# Patient Record
Sex: FEMALE | Race: WHITE | Employment: PART TIME | URBAN - METROPOLITAN AREA
[De-identification: names, ages, dates, MRNs, and addresses within clinical notes are randomized per-mention and may not be internally consistent; named-entity substitution may affect disease eponyms.]

---

## 2017-02-13 ENCOUNTER — HOSPITAL ENCOUNTER (OUTPATIENT)
Dept: MRI IMAGING | Age: 52
Discharge: HOME | End: 2017-02-13
Attending: ORTHOPAEDIC SURGERY
Payer: COMMERCIAL

## 2017-02-13 DIAGNOSIS — M84.374A STRESS FRACTURE OF RIGHT FOOT: ICD-10-CM

## 2017-02-13 PROCEDURE — 73721 MRI JNT OF LWR EXTRE W/O DYE: CPT

## 2017-08-21 ENCOUNTER — HOSPITAL ENCOUNTER (OUTPATIENT)
Dept: MAMMOGRAPHY | Age: 52
Discharge: HOME | End: 2017-08-21
Attending: FAMILY MEDICINE
Payer: COMMERCIAL

## 2017-08-21 DIAGNOSIS — Z12.31 SCREENING MAMMOGRAM, ENCOUNTER FOR: ICD-10-CM

## 2017-08-21 PROCEDURE — 77067 SCR MAMMO BI INCL CAD: CPT | Performed by: FAMILY MEDICINE

## 2017-08-25 ENCOUNTER — HOSPITAL ENCOUNTER (OUTPATIENT)
Dept: MAMMOGRAPHY | Age: 52
Discharge: HOME | End: 2017-08-25
Attending: FAMILY MEDICINE
Payer: COMMERCIAL

## 2017-08-25 ENCOUNTER — HOSPITAL ENCOUNTER (OUTPATIENT)
Dept: ULTRASOUND IMAGING | Age: 52
Discharge: HOME | End: 2017-08-25
Attending: FAMILY MEDICINE
Payer: COMMERCIAL

## 2017-08-25 DIAGNOSIS — R92.8 ABNORMAL MAMMOGRAM OF BOTH BREASTS: ICD-10-CM

## 2017-08-25 PROCEDURE — 77062 BREAST TOMOSYNTHESIS BI: CPT | Performed by: FAMILY MEDICINE

## 2017-08-25 PROCEDURE — 77066 DX MAMMO INCL CAD BI: CPT | Performed by: FAMILY MEDICINE

## 2017-08-25 PROCEDURE — 76642 ULTRASOUND BREAST LIMITED: CPT | Performed by: FAMILY MEDICINE

## 2018-11-16 ENCOUNTER — CHARTING TRANS (OUTPATIENT)
Dept: OTHER | Age: 53
End: 2018-11-16

## 2019-02-06 ENCOUNTER — LAB ENCOUNTER (OUTPATIENT)
Dept: LAB | Facility: HOSPITAL | Age: 54
End: 2019-02-06
Attending: FAMILY MEDICINE
Payer: COMMERCIAL

## 2019-02-06 DIAGNOSIS — R20.0 TACTILE ANESTHESIA: ICD-10-CM

## 2019-02-06 DIAGNOSIS — R20.0 NUMBNESS: ICD-10-CM

## 2019-02-06 DIAGNOSIS — Z00.00 ROUTINE GENERAL MEDICAL EXAMINATION AT A HEALTH CARE FACILITY: Primary | ICD-10-CM

## 2019-02-06 LAB
ALBUMIN SERPL-MCNC: 3.5 G/DL (ref 3.1–4.5)
ALBUMIN/GLOB SERPL: 1 {RATIO} (ref 1–2)
ALP LIVER SERPL-CCNC: 87 U/L (ref 41–108)
ALT SERPL-CCNC: 31 U/L (ref 14–54)
ANION GAP SERPL CALC-SCNC: 4 MMOL/L (ref 0–18)
AST SERPL-CCNC: 35 U/L (ref 15–41)
BASOPHILS # BLD AUTO: 0.01 X10(3) UL (ref 0–0.2)
BASOPHILS NFR BLD AUTO: 0.2 %
BILIRUB SERPL-MCNC: 0.5 MG/DL (ref 0.1–2)
BUN BLD-MCNC: 19 MG/DL (ref 8–20)
BUN/CREAT SERPL: 21.6 (ref 10–20)
CALCIUM BLD-MCNC: 8.1 MG/DL (ref 8.3–10.3)
CHLORIDE SERPL-SCNC: 111 MMOL/L (ref 101–111)
CHOLEST SMN-MCNC: 175 MG/DL (ref ?–200)
CO2 SERPL-SCNC: 27 MMOL/L (ref 22–32)
CREAT BLD-MCNC: 0.88 MG/DL (ref 0.55–1.02)
DEPRECATED RDW RBC AUTO: 47.1 FL (ref 35.1–46.3)
EOSINOPHIL # BLD AUTO: 0.09 X10(3) UL (ref 0–0.7)
EOSINOPHIL NFR BLD AUTO: 1.5 %
ERYTHROCYTE [DISTWIDTH] IN BLOOD BY AUTOMATED COUNT: 13.2 % (ref 11–15)
GLOBULIN PLAS-MCNC: 3.4 G/DL (ref 2.8–4.4)
GLUCOSE BLD-MCNC: 85 MG/DL (ref 70–99)
HAV AB SERPL IA-ACNC: 588 PG/ML (ref 193–986)
HCT VFR BLD AUTO: 35.5 % (ref 35–48)
HDLC SERPL-MCNC: 81 MG/DL (ref 40–59)
HGB BLD-MCNC: 12 G/DL (ref 12–16)
IMM GRANULOCYTES # BLD AUTO: 0.01 X10(3) UL (ref 0–1)
IMM GRANULOCYTES NFR BLD: 0.2 %
LDLC SERPL CALC-MCNC: 85 MG/DL (ref ?–100)
LYMPHOCYTES # BLD AUTO: 1.2 X10(3) UL (ref 1–4)
LYMPHOCYTES NFR BLD AUTO: 20.6 %
M PROTEIN MFR SERPL ELPH: 6.9 G/DL (ref 6.4–8.2)
MCH RBC QN AUTO: 33.1 PG (ref 26–34)
MCHC RBC AUTO-ENTMCNC: 33.8 G/DL (ref 31–37)
MCV RBC AUTO: 98.1 FL (ref 80–100)
MONOCYTES # BLD AUTO: 0.31 X10(3) UL (ref 0.1–1)
MONOCYTES NFR BLD AUTO: 5.3 %
NEUTROPHILS # BLD AUTO: 4.21 X10 (3) UL (ref 1.5–7.7)
NEUTROPHILS # BLD AUTO: 4.21 X10(3) UL (ref 1.5–7.7)
NEUTROPHILS NFR BLD AUTO: 72.2 %
NONHDLC SERPL-MCNC: 94 MG/DL (ref ?–130)
OSMOLALITY SERPL CALC.SUM OF ELEC: 296 MOSM/KG (ref 275–295)
PLATELET # BLD AUTO: 198 10(3)UL (ref 150–450)
POTASSIUM SERPL-SCNC: 4.3 MMOL/L (ref 3.6–5.1)
RBC # BLD AUTO: 3.62 X10(6)UL (ref 3.8–5.3)
SODIUM SERPL-SCNC: 142 MMOL/L (ref 136–144)
TRIGL SERPL-MCNC: 46 MG/DL (ref 30–149)
TSI SER-ACNC: 4.1 MIU/ML (ref 0.35–5.5)
VIT D+METAB SERPL-MCNC: 50.5 NG/ML (ref 30–100)
VLDLC SERPL CALC-MCNC: 9 MG/DL (ref 0–30)
WBC # BLD AUTO: 5.8 X10(3) UL (ref 4–11)

## 2019-02-06 PROCEDURE — 82607 VITAMIN B-12: CPT

## 2019-02-06 PROCEDURE — 80053 COMPREHEN METABOLIC PANEL: CPT

## 2019-02-06 PROCEDURE — 85025 COMPLETE CBC W/AUTO DIFF WBC: CPT

## 2019-02-06 PROCEDURE — 84443 ASSAY THYROID STIM HORMONE: CPT

## 2019-02-06 PROCEDURE — 82306 VITAMIN D 25 HYDROXY: CPT

## 2019-02-06 PROCEDURE — 80061 LIPID PANEL: CPT

## 2019-02-06 PROCEDURE — 36415 COLL VENOUS BLD VENIPUNCTURE: CPT

## 2019-02-13 ENCOUNTER — LAB ENCOUNTER (OUTPATIENT)
Dept: LAB | Facility: HOSPITAL | Age: 54
End: 2019-02-13
Attending: FAMILY MEDICINE
Payer: COMMERCIAL

## 2019-02-13 DIAGNOSIS — R35.0 URINARY FREQUENCY: Primary | ICD-10-CM

## 2019-02-13 LAB
T3FREE SERPL-MCNC: 2.39 PG/ML (ref 2.4–4.2)
T4 FREE SERPL-MCNC: 0.8 NG/DL (ref 0.8–1.7)
THYROGLOB SERPL-MCNC: 305 U/ML (ref ?–60)
THYROPEROXIDASE AB SERPL-ACNC: 863 U/ML (ref ?–60)

## 2019-02-13 PROCEDURE — 84439 ASSAY OF FREE THYROXINE: CPT

## 2019-02-13 PROCEDURE — 86376 MICROSOMAL ANTIBODY EACH: CPT

## 2019-02-13 PROCEDURE — 36415 COLL VENOUS BLD VENIPUNCTURE: CPT

## 2019-02-13 PROCEDURE — 86800 THYROGLOBULIN ANTIBODY: CPT

## 2019-02-13 PROCEDURE — 84481 FREE ASSAY (FT-3): CPT

## 2019-04-02 ENCOUNTER — LAB ENCOUNTER (OUTPATIENT)
Dept: LAB | Facility: HOSPITAL | Age: 54
End: 2019-04-02
Attending: FAMILY MEDICINE
Payer: COMMERCIAL

## 2019-04-02 DIAGNOSIS — E06.3 HASHIMOTO'S DISEASE: Primary | ICD-10-CM

## 2019-04-02 PROCEDURE — 84443 ASSAY THYROID STIM HORMONE: CPT

## 2019-04-02 PROCEDURE — 36415 COLL VENOUS BLD VENIPUNCTURE: CPT

## 2019-04-02 PROCEDURE — 84439 ASSAY OF FREE THYROXINE: CPT

## 2019-04-02 PROCEDURE — 83018 HEAVY METAL QUAN EACH NES: CPT

## 2019-04-02 PROCEDURE — 84630 ASSAY OF ZINC: CPT

## 2019-04-02 PROCEDURE — 84481 FREE ASSAY (FT-3): CPT

## 2019-05-15 ENCOUNTER — LAB ENCOUNTER (OUTPATIENT)
Dept: LAB | Facility: HOSPITAL | Age: 54
End: 2019-05-15
Attending: FAMILY MEDICINE
Payer: COMMERCIAL

## 2019-05-15 DIAGNOSIS — E06.3 HASHIMOTO'S DISEASE: Primary | ICD-10-CM

## 2019-05-15 PROCEDURE — 84443 ASSAY THYROID STIM HORMONE: CPT

## 2019-05-15 PROCEDURE — 86431 RHEUMATOID FACTOR QUANT: CPT

## 2019-05-15 PROCEDURE — 84481 FREE ASSAY (FT-3): CPT

## 2019-05-15 PROCEDURE — 86038 ANTINUCLEAR ANTIBODIES: CPT

## 2019-05-15 PROCEDURE — 36415 COLL VENOUS BLD VENIPUNCTURE: CPT

## 2019-05-15 PROCEDURE — 86200 CCP ANTIBODY: CPT

## 2019-05-15 PROCEDURE — 86376 MICROSOMAL ANTIBODY EACH: CPT

## 2019-05-15 PROCEDURE — 84439 ASSAY OF FREE THYROXINE: CPT

## 2019-05-15 PROCEDURE — 86800 THYROGLOBULIN ANTIBODY: CPT

## 2019-05-15 PROCEDURE — 85652 RBC SED RATE AUTOMATED: CPT

## 2019-08-26 ENCOUNTER — OFFICE VISIT (OUTPATIENT)
Dept: UROLOGY | Facility: HOSPITAL | Age: 54
End: 2019-08-26
Attending: OBSTETRICS & GYNECOLOGY
Payer: COMMERCIAL

## 2019-08-26 VITALS
HEIGHT: 66 IN | SYSTOLIC BLOOD PRESSURE: 90 MMHG | WEIGHT: 115 LBS | DIASTOLIC BLOOD PRESSURE: 56 MMHG | BODY MASS INDEX: 18.48 KG/M2

## 2019-08-26 DIAGNOSIS — R10.2 PELVIC PAIN: ICD-10-CM

## 2019-08-26 DIAGNOSIS — N95.2 POSTMENOPAUSAL ATROPHIC VAGINITIS: Primary | ICD-10-CM

## 2019-08-26 PROCEDURE — 99201 HC OUTPT EVAL AND MGNT NEW PT LEVEL 1: CPT

## 2019-08-26 RX ORDER — ESTRADIOL 0.1 MG/G
CREAM VAGINAL
Qty: 1 TUBE | Refills: 3 | Status: SHIPPED | OUTPATIENT
Start: 2019-08-26 | End: 2020-12-17

## 2019-08-26 RX ORDER — LEVOTHYROXINE SODIUM 0.05 MG/1
50 TABLET ORAL
COMMUNITY

## 2019-08-26 NOTE — PROGRESS NOTES
Patient to start estrace cream.  Call Rn with output numbers for a week.   Will return if numbers are high for post void residual.  Otherwise return in 3 months for UGA follow up and pelvic pain

## 2019-09-09 ENCOUNTER — LAB ENCOUNTER (OUTPATIENT)
Dept: LAB | Facility: HOSPITAL | Age: 54
End: 2019-09-09
Attending: FAMILY MEDICINE
Payer: COMMERCIAL

## 2019-09-09 DIAGNOSIS — E06.3 CHRONIC LYMPHOCYTIC THYROIDITIS: Primary | ICD-10-CM

## 2019-09-09 DIAGNOSIS — Z00.00 ROUTINE GENERAL MEDICAL EXAMINATION AT A HEALTH CARE FACILITY: ICD-10-CM

## 2019-09-09 LAB
ALBUMIN SERPL-MCNC: 3.8 G/DL (ref 3.4–5)
ALBUMIN/GLOB SERPL: 1.2 {RATIO} (ref 1–2)
ALP LIVER SERPL-CCNC: 83 U/L (ref 46–118)
ALT SERPL-CCNC: 32 U/L (ref 13–56)
ANION GAP SERPL CALC-SCNC: 3 MMOL/L (ref 0–18)
AST SERPL-CCNC: 26 U/L (ref 15–37)
BASOPHILS # BLD AUTO: 0.02 X10(3) UL (ref 0–0.2)
BASOPHILS NFR BLD AUTO: 0.7 %
BILIRUB SERPL-MCNC: 0.6 MG/DL (ref 0.1–2)
BUN BLD-MCNC: 18 MG/DL (ref 7–18)
BUN/CREAT SERPL: 19.4 (ref 10–20)
CALCIUM BLD-MCNC: 9 MG/DL (ref 8.5–10.1)
CHLORIDE SERPL-SCNC: 109 MMOL/L (ref 98–112)
CHOLEST SMN-MCNC: 207 MG/DL (ref ?–200)
CO2 SERPL-SCNC: 28 MMOL/L (ref 21–32)
CREAT BLD-MCNC: 0.93 MG/DL (ref 0.55–1.02)
DEPRECATED RDW RBC AUTO: 46 FL (ref 35.1–46.3)
EOSINOPHIL # BLD AUTO: 0.04 X10(3) UL (ref 0–0.7)
EOSINOPHIL NFR BLD AUTO: 1.4 %
ERYTHROCYTE [DISTWIDTH] IN BLOOD BY AUTOMATED COUNT: 12.8 % (ref 11–15)
GLOBULIN PLAS-MCNC: 3.2 G/DL (ref 2.8–4.4)
GLUCOSE BLD-MCNC: 92 MG/DL (ref 70–99)
HCT VFR BLD AUTO: 37.9 % (ref 35–48)
HDLC SERPL-MCNC: 92 MG/DL (ref 40–59)
HGB BLD-MCNC: 12.5 G/DL (ref 12–16)
IMM GRANULOCYTES # BLD AUTO: 0.01 X10(3) UL (ref 0–1)
IMM GRANULOCYTES NFR BLD: 0.4 %
LDLC SERPL CALC-MCNC: 103 MG/DL (ref ?–100)
LYMPHOCYTES # BLD AUTO: 1.03 X10(3) UL (ref 1–4)
LYMPHOCYTES NFR BLD AUTO: 36.9 %
M PROTEIN MFR SERPL ELPH: 7 G/DL (ref 6.4–8.2)
MCH RBC QN AUTO: 32.6 PG (ref 26–34)
MCHC RBC AUTO-ENTMCNC: 33 G/DL (ref 31–37)
MCV RBC AUTO: 99 FL (ref 80–100)
MONOCYTES # BLD AUTO: 0.27 X10(3) UL (ref 0.1–1)
MONOCYTES NFR BLD AUTO: 9.7 %
NEUTROPHILS # BLD AUTO: 1.42 X10 (3) UL (ref 1.5–7.7)
NEUTROPHILS # BLD AUTO: 1.42 X10(3) UL (ref 1.5–7.7)
NEUTROPHILS NFR BLD AUTO: 50.9 %
NONHDLC SERPL-MCNC: 115 MG/DL (ref ?–130)
OSMOLALITY SERPL CALC.SUM OF ELEC: 292 MOSM/KG (ref 275–295)
PLATELET # BLD AUTO: 197 10(3)UL (ref 150–450)
POTASSIUM SERPL-SCNC: 4.3 MMOL/L (ref 3.5–5.1)
RBC # BLD AUTO: 3.83 X10(6)UL (ref 3.8–5.3)
SODIUM SERPL-SCNC: 140 MMOL/L (ref 136–145)
T3FREE SERPL-MCNC: 2.35 PG/ML (ref 2.4–4.2)
T4 FREE SERPL-MCNC: 1 NG/DL (ref 0.8–1.7)
THYROGLOB SERPL-MCNC: 284 U/ML (ref ?–60)
THYROPEROXIDASE AB SERPL-ACNC: 1278 U/ML (ref ?–60)
TRIGL SERPL-MCNC: 59 MG/DL (ref 30–149)
TSI SER-ACNC: 0.95 MIU/ML (ref 0.36–3.74)
VLDLC SERPL CALC-MCNC: 12 MG/DL (ref 0–30)
WBC # BLD AUTO: 2.8 X10(3) UL (ref 4–11)

## 2019-09-09 PROCEDURE — 36415 COLL VENOUS BLD VENIPUNCTURE: CPT

## 2019-09-09 PROCEDURE — 84443 ASSAY THYROID STIM HORMONE: CPT

## 2019-09-09 PROCEDURE — 84481 FREE ASSAY (FT-3): CPT

## 2019-09-09 PROCEDURE — 85025 COMPLETE CBC W/AUTO DIFF WBC: CPT

## 2019-09-09 PROCEDURE — 80053 COMPREHEN METABOLIC PANEL: CPT

## 2019-09-09 PROCEDURE — 86800 THYROGLOBULIN ANTIBODY: CPT

## 2019-09-09 PROCEDURE — 84439 ASSAY OF FREE THYROXINE: CPT

## 2019-09-09 PROCEDURE — 86376 MICROSOMAL ANTIBODY EACH: CPT

## 2019-09-09 PROCEDURE — 80061 LIPID PANEL: CPT

## 2020-07-06 ENCOUNTER — LAB ENCOUNTER (OUTPATIENT)
Dept: LAB | Facility: HOSPITAL | Age: 55
End: 2020-07-06
Attending: FAMILY MEDICINE
Payer: COMMERCIAL

## 2020-07-06 DIAGNOSIS — E06.3 CHRONIC LYMPHOCYTIC THYROIDITIS: Primary | ICD-10-CM

## 2020-07-06 LAB
T3FREE SERPL-MCNC: 2.29 PG/ML (ref 2.4–4.2)
T4 FREE SERPL-MCNC: 0.9 NG/DL (ref 0.8–1.7)
TSI SER-ACNC: 1.98 MIU/ML (ref 0.36–3.74)

## 2020-07-06 PROCEDURE — 36415 COLL VENOUS BLD VENIPUNCTURE: CPT

## 2020-07-06 PROCEDURE — 84481 FREE ASSAY (FT-3): CPT

## 2020-07-06 PROCEDURE — 84439 ASSAY OF FREE THYROXINE: CPT

## 2020-07-06 PROCEDURE — 84443 ASSAY THYROID STIM HORMONE: CPT

## 2020-08-06 ENCOUNTER — LAB ENCOUNTER (OUTPATIENT)
Dept: LAB | Age: 55
End: 2020-08-06
Attending: ORTHOPAEDIC SURGERY
Payer: COMMERCIAL

## 2020-08-06 ENCOUNTER — HOSPITAL ENCOUNTER (OUTPATIENT)
Dept: BONE DENSITY | Age: 55
Discharge: HOME | End: 2020-08-06
Attending: ORTHOPAEDIC SURGERY
Payer: COMMERCIAL

## 2020-08-06 DIAGNOSIS — M81.0 AGE-RELATED OSTEOPOROSIS WITHOUT CURRENT PATHOLOGICAL FRACTURE: ICD-10-CM

## 2020-08-06 DIAGNOSIS — M72.2 PLANTAR FASCIAL FIBROMATOSIS: Primary | ICD-10-CM

## 2020-08-06 LAB
BASOPHILS # BLD AUTO: 0.02 X10(3) UL (ref 0–0.2)
BASOPHILS NFR BLD AUTO: 0.4 %
CRP SERPL HS-MCNC: 1.4 MG/L (ref ?–3)
CRP SERPL-MCNC: <0.29 MG/DL (ref ?–0.3)
DEPRECATED RDW RBC AUTO: 45 FL (ref 35.1–46.3)
EOSINOPHIL # BLD AUTO: 0.02 X10(3) UL (ref 0–0.7)
EOSINOPHIL NFR BLD AUTO: 0.4 %
ERYTHROCYTE [DISTWIDTH] IN BLOOD BY AUTOMATED COUNT: 12 % (ref 11–15)
EST. AVERAGE GLUCOSE BLD GHB EST-MCNC: 105 MG/DL (ref 68–126)
HBA1C MFR BLD HPLC: 5.3 % (ref ?–5.7)
HCT VFR BLD AUTO: 38.5 % (ref 35–48)
HGB BLD-MCNC: 12.4 G/DL (ref 12–16)
IMM GRANULOCYTES # BLD AUTO: 0.01 X10(3) UL (ref 0–1)
IMM GRANULOCYTES NFR BLD: 0.2 %
LYMPHOCYTES # BLD AUTO: 1.13 X10(3) UL (ref 1–4)
LYMPHOCYTES NFR BLD AUTO: 22.3 %
MCH RBC QN AUTO: 32.7 PG (ref 26–34)
MCHC RBC AUTO-ENTMCNC: 32.2 G/DL (ref 31–37)
MCV RBC AUTO: 101.6 FL (ref 80–100)
MONOCYTES # BLD AUTO: 0.27 X10(3) UL (ref 0.1–1)
MONOCYTES NFR BLD AUTO: 5.3 %
NEUTROPHILS # BLD AUTO: 3.61 X10 (3) UL (ref 1.5–7.7)
NEUTROPHILS # BLD AUTO: 3.61 X10(3) UL (ref 1.5–7.7)
NEUTROPHILS NFR BLD AUTO: 71.4 %
PLATELET # BLD AUTO: 199 10(3)UL (ref 150–450)
RBC # BLD AUTO: 3.79 X10(6)UL (ref 3.8–5.3)
RHEUMATOID FACT SERPL-ACNC: <10 IU/ML (ref ?–15)
SED RATE-ML: 9 MM/HR (ref 0–25)
URATE SERPL-MCNC: 2.8 MG/DL (ref 2.6–6)
WBC # BLD AUTO: 5.1 X10(3) UL (ref 4–11)

## 2020-08-06 PROCEDURE — 86200 CCP ANTIBODY: CPT

## 2020-08-06 PROCEDURE — 85025 COMPLETE CBC W/AUTO DIFF WBC: CPT

## 2020-08-06 PROCEDURE — 86141 C-REACTIVE PROTEIN HS: CPT

## 2020-08-06 PROCEDURE — 84550 ASSAY OF BLOOD/URIC ACID: CPT

## 2020-08-06 PROCEDURE — 85652 RBC SED RATE AUTOMATED: CPT

## 2020-08-06 PROCEDURE — 83036 HEMOGLOBIN GLYCOSYLATED A1C: CPT

## 2020-08-06 PROCEDURE — 86038 ANTINUCLEAR ANTIBODIES: CPT

## 2020-08-06 PROCEDURE — 77080 DXA BONE DENSITY AXIAL: CPT | Performed by: PHYSICIAN ASSISTANT

## 2020-08-06 PROCEDURE — 86140 C-REACTIVE PROTEIN: CPT

## 2020-08-06 PROCEDURE — 86812 HLA TYPING A B OR C: CPT

## 2020-08-06 PROCEDURE — 36415 COLL VENOUS BLD VENIPUNCTURE: CPT

## 2020-08-06 PROCEDURE — 86431 RHEUMATOID FACTOR QUANT: CPT

## 2020-08-07 LAB — CCP IGG SERPL-ACNC: 1.2 U/ML (ref 0–6.9)

## 2020-08-08 LAB — HLA-B27: NEGATIVE

## 2020-08-10 LAB — ANA SCREEN: NEGATIVE

## 2020-12-17 ENCOUNTER — OFFICE VISIT (OUTPATIENT)
Dept: PODIATRY CLINIC | Facility: CLINIC | Age: 55
End: 2020-12-17
Payer: COMMERCIAL

## 2020-12-17 VITALS — SYSTOLIC BLOOD PRESSURE: 124 MMHG | DIASTOLIC BLOOD PRESSURE: 76 MMHG

## 2020-12-17 DIAGNOSIS — M72.2 PLANTAR FASCIITIS: Primary | ICD-10-CM

## 2020-12-17 PROCEDURE — 76942 ECHO GUIDE FOR BIOPSY: CPT | Performed by: PODIATRIST

## 2020-12-17 PROCEDURE — 99203 OFFICE O/P NEW LOW 30 MIN: CPT | Performed by: PODIATRIST

## 2020-12-17 PROCEDURE — 3078F DIAST BP <80 MM HG: CPT | Performed by: PODIATRIST

## 2020-12-17 PROCEDURE — 20550 NJX 1 TENDON SHEATH/LIGAMENT: CPT | Performed by: PODIATRIST

## 2020-12-17 PROCEDURE — 3074F SYST BP LT 130 MM HG: CPT | Performed by: PODIATRIST

## 2020-12-17 RX ORDER — DEXAMETHASONE SODIUM PHOSPHATE 4 MG/ML
2 INJECTION, SOLUTION INTRA-ARTICULAR; INTRALESIONAL; INTRAMUSCULAR; INTRAVENOUS; SOFT TISSUE ONCE
Status: COMPLETED | OUTPATIENT
Start: 2020-12-17 | End: 2020-12-17

## 2020-12-17 RX ADMIN — DEXAMETHASONE SODIUM PHOSPHATE 2 MG: 4 INJECTION, SOLUTION INTRA-ARTICULAR; INTRALESIONAL; INTRAMUSCULAR; INTRAVENOUS; SOFT TISSUE at 15:00:00

## 2020-12-17 NOTE — PROCEDURES
Per Dr Katerina Dunbar to draw up .5ml of dexamethasone sodium phosphate, .5ml kenalog-10 and .5ml sesorcaine 0.5% for a left foot injection. Pt had left office before I could obtain post injection vitals.

## 2020-12-17 NOTE — PROGRESS NOTES
Tanya Castro is a 60year old female.  Patient presents with:  New Patient: last seen 4 years ago - left foot pain - pain scale 5/10 - denies taking pain medication - has been seen an orthopedic for fx and plantar fasciitis, here for other treatment options arthritis, back pain      EXAM:   /76 (BP Location: Left arm, Patient Position: Sitting, Cuff Size: adult)   Physical Exam  GENERAL: well developed, well nourished, in no apparent distress  EXTREMITIES:   1.  Integument: Normal skin temperature and tu

## 2021-01-21 ENCOUNTER — OFFICE VISIT (OUTPATIENT)
Dept: PODIATRY CLINIC | Facility: CLINIC | Age: 56
End: 2021-01-21
Payer: COMMERCIAL

## 2021-01-21 DIAGNOSIS — M72.2 PLANTAR FASCIITIS: Primary | ICD-10-CM

## 2021-01-21 PROCEDURE — 99213 OFFICE O/P EST LOW 20 MIN: CPT | Performed by: PODIATRIST

## 2021-01-21 RX ORDER — VALACYCLOVIR HYDROCHLORIDE 1 G/1
TABLET, FILM COATED ORAL
COMMUNITY
Start: 2017-10-10

## 2021-01-21 RX ORDER — CYANOCOBALAMIN (VITAMIN B-12) 1500 MCG
TABLET,DISINTEGRATING ORAL
COMMUNITY
Start: 2020-10-11

## 2021-01-21 NOTE — PROGRESS NOTES
Yinka Lambert is a 60year old female. Patient presents with: Follow - Up: left foot a lot better after cortisone injection - minimal pain.         HPI:     Patient presents today for follow-up regarding her heel pain she is done well after the injection in nourished, in no apparent distress  EXTREMITIES:   1. Integument: Normal skin temperature and turgor  2.  Vascular: Dorsalis pedis two out of four bilateral and posterior tibial pulses two out of   four bilateral, capillary refill normal.   3. Musculoskelet

## 2021-02-23 ENCOUNTER — TELEPHONE (OUTPATIENT)
Dept: UROLOGY | Facility: HOSPITAL | Age: 56
End: 2021-02-23

## 2021-02-23 RX ORDER — ESTRADIOL 0.1 MG/G
1 CREAM VAGINAL
COMMUNITY

## 2021-02-23 NOTE — TELEPHONE ENCOUNTER
Patient has return appt with Dr. Urbano File ( processing as new) on 3/12/21- last visit 8/26/19 - dx - pelvic pain Raguel Chalk urgency.  UGA - patient also had hx of urinary retention and had to [id number] in past- Plan at that time - Estrace and record voids / volumes wi

## 2021-02-25 NOTE — TELEPHONE ENCOUNTER
Patient called to inform us she passed a small blood clot from vagina and will let her gyne know as well. Wanted to send picture through [employer]. We discussed her urinary and bowel habits. Re discussed what Juma had on prior call.   Patient has appointmen

## 2021-03-12 ENCOUNTER — LAB ENCOUNTER (OUTPATIENT)
Dept: LAB | Age: 56
End: 2021-03-12
Attending: OBSTETRICS & GYNECOLOGY
Payer: COMMERCIAL

## 2021-03-12 ENCOUNTER — OFFICE VISIT (OUTPATIENT)
Dept: UROLOGY | Facility: HOSPITAL | Age: 56
End: 2021-03-12
Attending: OBSTETRICS & GYNECOLOGY
Payer: COMMERCIAL

## 2021-03-12 VITALS — BODY MASS INDEX: 19 KG/M2 | RESPIRATION RATE: 16 BRPM | WEIGHT: 115 LBS | TEMPERATURE: 98 F

## 2021-03-12 DIAGNOSIS — R10.2 PELVIC PAIN: ICD-10-CM

## 2021-03-12 DIAGNOSIS — N95.2 POSTMENOPAUSAL ATROPHIC VAGINITIS: Primary | ICD-10-CM

## 2021-03-12 PROCEDURE — 87077 CULTURE AEROBIC IDENTIFY: CPT

## 2021-03-12 PROCEDURE — 99212 OFFICE O/P EST SF 10 MIN: CPT

## 2021-03-12 PROCEDURE — 87086 URINE CULTURE/COLONY COUNT: CPT

## 2021-03-12 RX ORDER — FERROUS SULFATE 325(65) MG
325 TABLET ORAL DAILY
COMMUNITY

## 2021-03-12 RX ORDER — THIAMINE HCL 100 MG
TABLET ORAL
COMMUNITY

## 2021-03-12 NOTE — PROGRESS NOTES
Patient called after visit to inquire about AZO prescribed by Dr Colton Saez. VORB Dr Colton Saez 1 tablet AZO 2 times daily to start can increase to 2 tablets 3 times daily. Patient also asked about AZO urine test strip being purple.  Discussed first step to urine cul

## 2021-03-12 NOTE — PATIENT INSTRUCTIONS
Dr. Leanna Whipple wants you to take over the counter AZO twice daily for two weeks then as needed, to call the office in 2-3 weeks with an update

## 2021-03-15 ENCOUNTER — TELEPHONE (OUTPATIENT)
Dept: UROLOGY | Facility: HOSPITAL | Age: 56
End: 2021-03-15

## 2021-03-15 RX ORDER — NITROFURANTOIN 25; 75 MG/1; MG/1
100 CAPSULE ORAL 2 TIMES DAILY
Qty: 14 CAPSULE | Refills: 0 | Status: SHIPPED | OUTPATIENT
Start: 2021-03-15

## 2021-03-15 NOTE — TELEPHONE ENCOUNTER
Pt called this am regarding urine culture results, shows 10-50 GBS. She reports having increased pelvic cramping, vaginal spotting and clotting this weekend. Using AZO BID as directed at recent Dr. Jose Manuel Chatman visit, no improvements.   She states she had D&C abo

## 2021-03-23 ENCOUNTER — TELEPHONE (OUTPATIENT)
Dept: UROLOGY | Facility: HOSPITAL | Age: 56
End: 2021-03-23

## 2021-03-23 NOTE — TELEPHONE ENCOUNTER
Patient called left message stating took last macrobid yesterday and proceeded to break out in hives doing fine taking benadryl. Just wanted us to know. Patient will call at end of week with an update.

## 2021-03-29 ENCOUNTER — TELEPHONE (OUTPATIENT)
Dept: UROLOGY | Facility: HOSPITAL | Age: 56
End: 2021-03-29

## 2021-03-29 NOTE — TELEPHONE ENCOUNTER
Patient called stating Juan Velasco is not helping a ton, Ultrasound WNL, uterus is fine. 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 Will stop AZO. Patient will stop estradiol for the next 5 days then restart.  Discussed estradiol insertio

## 2021-10-14 ENCOUNTER — LAB ENCOUNTER (OUTPATIENT)
Dept: LAB | Facility: HOSPITAL | Age: 56
End: 2021-10-14
Attending: FAMILY MEDICINE
Payer: COMMERCIAL

## 2021-10-14 DIAGNOSIS — E06.3 HASHIMOTO'S DISEASE: Primary | ICD-10-CM

## 2021-10-14 PROCEDURE — 84439 ASSAY OF FREE THYROXINE: CPT

## 2021-10-14 PROCEDURE — 84443 ASSAY THYROID STIM HORMONE: CPT

## 2021-10-14 PROCEDURE — 84481 FREE ASSAY (FT-3): CPT

## 2021-10-14 PROCEDURE — 36415 COLL VENOUS BLD VENIPUNCTURE: CPT

## 2021-12-09 ENCOUNTER — LAB ENCOUNTER (OUTPATIENT)
Dept: LAB | Facility: HOSPITAL | Age: 56
End: 2021-12-09
Attending: FAMILY MEDICINE
Payer: COMMERCIAL

## 2021-12-09 DIAGNOSIS — Z00.00 ROUTINE GENERAL MEDICAL EXAMINATION AT A HEALTH CARE FACILITY: Primary | ICD-10-CM

## 2021-12-09 PROCEDURE — 36415 COLL VENOUS BLD VENIPUNCTURE: CPT

## 2021-12-09 PROCEDURE — 80061 LIPID PANEL: CPT

## 2021-12-09 PROCEDURE — 85025 COMPLETE CBC W/AUTO DIFF WBC: CPT

## 2021-12-09 PROCEDURE — 80053 COMPREHEN METABOLIC PANEL: CPT

## 2022-08-19 ENCOUNTER — OFFICE VISIT (OUTPATIENT)
Facility: LOCATION | Age: 57
End: 2022-08-19
Payer: COMMERCIAL

## 2022-08-19 DIAGNOSIS — H60.8X3 CHRONIC ECZEMATOUS OTITIS EXTERNA OF BOTH EARS: Primary | ICD-10-CM

## 2022-08-19 PROCEDURE — 99213 OFFICE O/P EST LOW 20 MIN: CPT | Performed by: OTOLARYNGOLOGY

## 2022-08-19 NOTE — PROGRESS NOTES
Jie Adhikari is a 59year old female. Patient presents with:  Cerumen Impaction    HPI:   She has a history of cerumen impactions and eczema. She uses cortisone cream.  She is also had some tinnitus for many years. REVIEW OF SYSTEMS:   GENERAL HEALTH: feels well otherwise  GENERAL : denies fever, chills, sweats, weight loss, weight gain  SKIN: denies any unusual skin lesions or rashes  RESPIRATORY: denies shortness of breath with exertion  NEURO: denies headaches    EXAM:   There were no vitals taken for this visit. System Findings Details   Constitutional Normal Overall appearance - Normal.   Psychiatric Normal Orientation - Oriented to time, place, person & situation. Appropriate mood and affect. Head/Face Normal Facial features -- Normal. Skull - Normal.   Eyes Normal Pupils equal ,round ,react to light and accomidate   Ears, Nose, Throat, Neck  abnormal  earwax and debris in the ears bilaterally. This was removed today under the microscope. Tympanic nares are clear. Nose clear oropharynx clear neck no masses   Neurological Normal Memory - Normal. Cranial nerves - Cranial nerves II through XII grossly intact. Lymph Detail Normal Submental. Submandibular. Anterior cervical. Posterior cervical. Supraclavicular. ASSESSMENT AND PLAN:   1. Chronic eczematous otitis externa of both ears  Will continue on cortisone cream.  He does have some tinnitus and I instructed her the tinnitus should worsen she should see me back with an audiogram.      The patient indicates understanding of these issues and agrees to the plan. No follow-ups on file.     Zulema Rodrigues MD  8/19/2022  11:52 AM

## 2022-11-02 ENCOUNTER — LAB ENCOUNTER (OUTPATIENT)
Dept: LAB | Facility: HOSPITAL | Age: 57
End: 2022-11-02
Attending: STUDENT IN AN ORGANIZED HEALTH CARE EDUCATION/TRAINING PROGRAM
Payer: COMMERCIAL

## 2022-11-02 DIAGNOSIS — Z00.00 ROUTINE GENERAL MEDICAL EXAMINATION AT A HEALTH CARE FACILITY: Primary | ICD-10-CM

## 2022-11-02 LAB
ANION GAP SERPL CALC-SCNC: 2 MMOL/L (ref 0–18)
BASOPHILS # BLD AUTO: 0.04 X10(3) UL (ref 0–0.2)
BASOPHILS NFR BLD AUTO: 1.3 %
BUN BLD-MCNC: 21 MG/DL (ref 7–18)
CALCIUM BLD-MCNC: 8.6 MG/DL (ref 8.5–10.1)
CHLORIDE SERPL-SCNC: 110 MMOL/L (ref 98–112)
CHOLEST SERPL-MCNC: 177 MG/DL (ref ?–200)
CO2 SERPL-SCNC: 27 MMOL/L (ref 21–32)
CREAT BLD-MCNC: 0.87 MG/DL
EOSINOPHIL # BLD AUTO: 0.07 X10(3) UL (ref 0–0.7)
EOSINOPHIL NFR BLD AUTO: 2.2 %
ERYTHROCYTE [DISTWIDTH] IN BLOOD BY AUTOMATED COUNT: 11.6 %
EST. AVERAGE GLUCOSE BLD GHB EST-MCNC: 108 MG/DL (ref 68–126)
FASTING PATIENT LIPID ANSWER: YES
FASTING STATUS PATIENT QL REPORTED: YES
GFR SERPLBLD BASED ON 1.73 SQ M-ARVRAT: 77 ML/MIN/1.73M2 (ref 60–?)
GLUCOSE BLD-MCNC: 96 MG/DL (ref 70–99)
HBA1C MFR BLD: 5.4 % (ref ?–5.7)
HCT VFR BLD AUTO: 42.3 %
HDLC SERPL-MCNC: 74 MG/DL (ref 40–59)
HGB BLD-MCNC: 14.3 G/DL
IMM GRANULOCYTES # BLD AUTO: 0 X10(3) UL (ref 0–1)
IMM GRANULOCYTES NFR BLD: 0 %
LDLC SERPL CALC-MCNC: 93 MG/DL (ref ?–100)
LYMPHOCYTES # BLD AUTO: 1.32 X10(3) UL (ref 1–4)
LYMPHOCYTES NFR BLD AUTO: 41.4 %
MCH RBC QN AUTO: 34.5 PG (ref 26–34)
MCHC RBC AUTO-ENTMCNC: 33.8 G/DL (ref 31–37)
MCV RBC AUTO: 101.9 FL
MONOCYTES # BLD AUTO: 0.31 X10(3) UL (ref 0.1–1)
MONOCYTES NFR BLD AUTO: 9.7 %
NEUTROPHILS # BLD AUTO: 1.45 X10 (3) UL (ref 1.5–7.7)
NEUTROPHILS # BLD AUTO: 1.45 X10(3) UL (ref 1.5–7.7)
NEUTROPHILS NFR BLD AUTO: 45.4 %
NONHDLC SERPL-MCNC: 103 MG/DL (ref ?–130)
OSMOLALITY SERPL CALC.SUM OF ELEC: 291 MOSM/KG (ref 275–295)
PLATELET # BLD AUTO: 182 10(3)UL (ref 150–450)
POTASSIUM SERPL-SCNC: 4 MMOL/L (ref 3.5–5.1)
RBC # BLD AUTO: 4.15 X10(6)UL
SODIUM SERPL-SCNC: 139 MMOL/L (ref 136–145)
TRIGL SERPL-MCNC: 49 MG/DL (ref 30–149)
TSI SER-ACNC: 3.01 MIU/ML (ref 0.36–3.74)
VLDLC SERPL CALC-MCNC: 8 MG/DL (ref 0–30)
WBC # BLD AUTO: 3.2 X10(3) UL (ref 4–11)

## 2022-11-02 PROCEDURE — 84443 ASSAY THYROID STIM HORMONE: CPT

## 2022-11-02 PROCEDURE — 36415 COLL VENOUS BLD VENIPUNCTURE: CPT

## 2022-11-02 PROCEDURE — 83036 HEMOGLOBIN GLYCOSYLATED A1C: CPT

## 2022-11-02 PROCEDURE — 80061 LIPID PANEL: CPT

## 2022-11-02 PROCEDURE — 80048 BASIC METABOLIC PNL TOTAL CA: CPT

## 2022-11-02 PROCEDURE — 85025 COMPLETE CBC W/AUTO DIFF WBC: CPT

## 2024-02-15 ENCOUNTER — OFFICE VISIT (OUTPATIENT)
Facility: LOCATION | Age: 59
End: 2024-02-15
Payer: COMMERCIAL

## 2024-02-15 DIAGNOSIS — J32.2 CHRONIC ETHMOIDAL SINUSITIS: Primary | ICD-10-CM

## 2024-02-15 PROCEDURE — 99213 OFFICE O/P EST LOW 20 MIN: CPT | Performed by: OTOLARYNGOLOGY

## 2024-02-15 RX ORDER — CEFUROXIME AXETIL 250 MG/1
250 TABLET ORAL 2 TIMES DAILY
Qty: 20 TABLET | Refills: 0 | Status: SHIPPED | OUTPATIENT
Start: 2024-02-15

## 2024-02-16 NOTE — PROGRESS NOTES
Dilcia Rizvi is a 58 year old female.   Chief Complaint   Patient presents with    Sinus Problem     HPI:   She has had a chronic sinus problem since December.  It tends to come and go.  She has had off-and-on headache nasal congestion drainage and cough    REVIEW OF SYSTEMS:   GENERAL HEALTH: feels well otherwise  GENERAL : denies fever, chills, sweats, weight loss, weight gain  SKIN: denies any unusual skin lesions or rashes  RESPIRATORY: denies shortness of breath with exertion  NEURO: denies headaches    EXAM:   There were no vitals taken for this visit.    System Findings Details   Constitutional  Overall appearance - Normal.   Psychiatric  Orientation - Oriented to time, place, person & situation. Appropriate mood and affect.   Head/Face  Facial features -- Normal. Skull - Normal.   Eyes  Pupils equal ,round ,react to light and accomidate   Ears, Nose, Throat, Neck  Ears clear nose mild erythema oral cavity clear oropharynx cobblestoning neck no masses   Neurological  Memory - Normal. Cranial nerves - Cranial nerves II through XII grossly intact.   Lymph Detail  Submental. Submandibular. Anterior cervical. Posterior cervical. Supraclavicular.       ASSESSMENT AND PLAN:   1. Chronic ethmoidal sinusitis  She will take Ceftin antibiotic.  She will contact me if she has not improved.      The patient indicates understanding of these issues and agrees to the plan.    No follow-ups on file.    Mo Siegel MD  2/15/2024  6:16 PM

## 2024-03-06 ENCOUNTER — TELEPHONE (OUTPATIENT)
Facility: LOCATION | Age: 59
End: 2024-03-06

## 2024-03-11 ENCOUNTER — TELEPHONE (OUTPATIENT)
Facility: LOCATION | Age: 59
End: 2024-03-11

## 2024-03-11 ENCOUNTER — MOBILE ENCOUNTER (OUTPATIENT)
Facility: LOCATION | Age: 59
End: 2024-03-11

## 2024-03-11 RX ORDER — CEFUROXIME AXETIL 500 MG/1
500 TABLET ORAL 2 TIMES DAILY
Qty: 20 TABLET | Refills: 0 | Status: SHIPPED | OUTPATIENT
Start: 2024-03-11 | End: 2024-03-21

## 2024-03-11 NOTE — PROGRESS NOTES
Patient got better on Ceftin 250 mg and then got worse again after 1 week. She just had shoulder surgery. She will now take 500 mg of Ceftin for 10 days. If this does not work she will call back and I will do a CT of the sinus.

## 2024-03-29 ENCOUNTER — TELEPHONE (OUTPATIENT)
Facility: LOCATION | Age: 59
End: 2024-03-29

## 2025-07-30 ENCOUNTER — OFFICE VISIT (OUTPATIENT)
Dept: OTHER | Facility: HOSPITAL | Age: 60
End: 2025-07-30
Attending: PREVENTIVE MEDICINE

## 2025-07-30 DIAGNOSIS — Z01.84 IMMUNITY STATUS TESTING: ICD-10-CM

## 2025-07-30 DIAGNOSIS — Z11.1 SCREENING-PULMONARY TB: Primary | ICD-10-CM

## 2025-07-30 LAB
RUBV IGG SER QL: POSITIVE
RUBV IGG SER-ACNC: 103 IU/ML (ref 10–?)

## 2025-07-30 PROCEDURE — 86787 VARICELLA-ZOSTER ANTIBODY: CPT | Performed by: PREVENTIVE MEDICINE

## 2025-07-30 PROCEDURE — 86480 TB TEST CELL IMMUN MEASURE: CPT | Performed by: PREVENTIVE MEDICINE

## 2025-07-30 PROCEDURE — 86765 RUBEOLA ANTIBODY: CPT | Performed by: PREVENTIVE MEDICINE

## 2025-07-30 PROCEDURE — 86762 RUBELLA ANTIBODY: CPT | Performed by: PREVENTIVE MEDICINE

## 2025-07-30 PROCEDURE — 86735 MUMPS ANTIBODY: CPT | Performed by: PREVENTIVE MEDICINE

## 2025-08-01 LAB
M TB IFN-G CD4+ T-CELLS BLD-ACNC: 0.01 IU/ML
M TB TUBERC IFN-G BLD QL: NEGATIVE
M TB TUBERC IGNF/MITOGEN IGNF CONTROL: 8.63 IU/ML
MEV IGG SER-ACNC: 160 AU/ML (ref 16.5–?)
MUV IGG SER IA-ACNC: >300 AU/ML (ref 11–?)
QFT TB1 AG MINUS NIL: 0 IU/ML
QFT TB2 AG MINUS NIL: 0 IU/ML
VZV IGG SER IA-ACNC: 43.2 (ref 1–?)

## (undated) NOTE — LETTER
AUTHORIZATION FOR SURGICAL OPERATION OR OTHER PROCEDURE    1.  I hereby authorize Dr. Katerina Dunbar, and [address] [place] [address] staff assigned to my case to perform the following operation and/or procedure at the [address] [place] [address]:    ________________________________ Time:  ________ A. M.  P.M.        Patient Name:  ______________________________________________________  (please print)      Patient signature:  ___________________________________________________             Relationship to Patient: